# Patient Record
Sex: FEMALE | Race: WHITE | NOT HISPANIC OR LATINO | URBAN - METROPOLITAN AREA
[De-identification: names, ages, dates, MRNs, and addresses within clinical notes are randomized per-mention and may not be internally consistent; named-entity substitution may affect disease eponyms.]

---

## 2017-01-12 ENCOUNTER — EMERGENCY (EMERGENCY)
Facility: HOSPITAL | Age: 62
LOS: 1 days | Discharge: HOME | End: 2017-01-12
Attending: EMERGENCY MEDICINE | Admitting: EMERGENCY MEDICINE
Payer: COMMERCIAL

## 2017-01-12 VITALS
TEMPERATURE: 98 F | DIASTOLIC BLOOD PRESSURE: 80 MMHG | HEIGHT: 63 IN | HEART RATE: 82 BPM | WEIGHT: 160.06 LBS | SYSTOLIC BLOOD PRESSURE: 122 MMHG | OXYGEN SATURATION: 98 % | RESPIRATION RATE: 16 BRPM

## 2017-01-12 DIAGNOSIS — R07.89 OTHER CHEST PAIN: ICD-10-CM

## 2017-01-12 LAB
ALBUMIN SERPL ELPH-MCNC: 4.9 G/DL — SIGNIFICANT CHANGE UP (ref 3.3–5)
ALP SERPL-CCNC: 63 U/L — SIGNIFICANT CHANGE UP (ref 40–120)
ALT FLD-CCNC: 18 U/L RC — SIGNIFICANT CHANGE UP (ref 10–45)
ANION GAP SERPL CALC-SCNC: 14 MMOL/L — SIGNIFICANT CHANGE UP (ref 5–17)
APTT BLD: 30.4 SEC — SIGNIFICANT CHANGE UP (ref 27.5–37.4)
AST SERPL-CCNC: 21 U/L — SIGNIFICANT CHANGE UP (ref 10–40)
BASOPHILS # BLD AUTO: 0.1 K/UL — SIGNIFICANT CHANGE UP (ref 0–0.2)
BASOPHILS NFR BLD AUTO: 0.6 % — SIGNIFICANT CHANGE UP (ref 0–2)
BILIRUB SERPL-MCNC: 0.2 MG/DL — SIGNIFICANT CHANGE UP (ref 0.2–1.2)
BUN SERPL-MCNC: 18 MG/DL — SIGNIFICANT CHANGE UP (ref 7–23)
CALCIUM SERPL-MCNC: 9.7 MG/DL — SIGNIFICANT CHANGE UP (ref 8.4–10.5)
CHLORIDE SERPL-SCNC: 102 MMOL/L — SIGNIFICANT CHANGE UP (ref 96–108)
CO2 SERPL-SCNC: 26 MMOL/L — SIGNIFICANT CHANGE UP (ref 22–31)
CREAT SERPL-MCNC: 1.01 MG/DL — SIGNIFICANT CHANGE UP (ref 0.5–1.3)
D DIMER BLD IA.RAPID-MCNC: <150 NG/ML DDU — SIGNIFICANT CHANGE UP
EOSINOPHIL # BLD AUTO: 0.2 K/UL — SIGNIFICANT CHANGE UP (ref 0–0.5)
EOSINOPHIL NFR BLD AUTO: 1.6 % — SIGNIFICANT CHANGE UP (ref 0–6)
GLUCOSE SERPL-MCNC: 101 MG/DL — HIGH (ref 70–99)
HCT VFR BLD CALC: 38.4 % — SIGNIFICANT CHANGE UP (ref 34.5–45)
HGB BLD-MCNC: 13.3 G/DL — SIGNIFICANT CHANGE UP (ref 11.5–15.5)
INR BLD: 0.97 RATIO — SIGNIFICANT CHANGE UP (ref 0.88–1.16)
LYMPHOCYTES # BLD AUTO: 3.5 K/UL — HIGH (ref 1–3.3)
LYMPHOCYTES # BLD AUTO: 32.5 % — SIGNIFICANT CHANGE UP (ref 13–44)
MCHC RBC-ENTMCNC: 31.4 PG — SIGNIFICANT CHANGE UP (ref 27–34)
MCHC RBC-ENTMCNC: 34.6 GM/DL — SIGNIFICANT CHANGE UP (ref 32–36)
MCV RBC AUTO: 91 FL — SIGNIFICANT CHANGE UP (ref 80–100)
MONOCYTES # BLD AUTO: 0.8 K/UL — SIGNIFICANT CHANGE UP (ref 0–0.9)
MONOCYTES NFR BLD AUTO: 7.2 % — SIGNIFICANT CHANGE UP (ref 2–14)
NEUTROPHILS # BLD AUTO: 6.3 K/UL — SIGNIFICANT CHANGE UP (ref 1.8–7.4)
NEUTROPHILS NFR BLD AUTO: 58.2 % — SIGNIFICANT CHANGE UP (ref 43–77)
PLATELET # BLD AUTO: 225 K/UL — SIGNIFICANT CHANGE UP (ref 150–400)
POTASSIUM SERPL-MCNC: 3.8 MMOL/L — SIGNIFICANT CHANGE UP (ref 3.5–5.3)
POTASSIUM SERPL-SCNC: 3.8 MMOL/L — SIGNIFICANT CHANGE UP (ref 3.5–5.3)
PROT SERPL-MCNC: 7.7 G/DL — SIGNIFICANT CHANGE UP (ref 6–8.3)
PROTHROM AB SERPL-ACNC: 10.6 SEC — SIGNIFICANT CHANGE UP (ref 10–13.1)
RBC # BLD: 4.23 M/UL — SIGNIFICANT CHANGE UP (ref 3.8–5.2)
RBC # FLD: 12.1 % — SIGNIFICANT CHANGE UP (ref 10.3–14.5)
SODIUM SERPL-SCNC: 142 MMOL/L — SIGNIFICANT CHANGE UP (ref 135–145)
TROPONIN T SERPL-MCNC: <0.01 NG/ML — SIGNIFICANT CHANGE UP (ref 0–0.06)
WBC # BLD: 10.8 K/UL — HIGH (ref 3.8–10.5)
WBC # FLD AUTO: 10.8 K/UL — HIGH (ref 3.8–10.5)

## 2017-01-12 PROCEDURE — 99285 EMERGENCY DEPT VISIT HI MDM: CPT | Mod: 25

## 2017-01-12 PROCEDURE — 71010: CPT | Mod: 26

## 2017-01-12 PROCEDURE — 93010 ELECTROCARDIOGRAM REPORT: CPT | Mod: NC

## 2017-01-12 RX ORDER — LIDOCAINE 4 G/100G
10 CREAM TOPICAL ONCE
Qty: 0 | Refills: 0 | Status: COMPLETED | OUTPATIENT
Start: 2017-01-12 | End: 2017-01-12

## 2017-01-12 RX ADMIN — LIDOCAINE 10 MILLILITER(S): 4 CREAM TOPICAL at 21:46

## 2017-01-12 RX ADMIN — Medication 30 MILLILITER(S): at 21:46

## 2017-01-12 NOTE — ED PROVIDER NOTE - NS ED MD SCRIBE ATTENDING SCRIBE SECTIONS
HIV/VITAL SIGNS( Pullset)/PHYSICAL EXAM/REVIEW OF SYSTEMS/HISTORY OF PRESENT ILLNESS/PAST MEDICAL/SURGICAL/SOCIAL HISTORY/DISPOSITION

## 2017-01-12 NOTE — ED PROVIDER NOTE - MEDICAL DECISION MAKING DETAILS
61 year old female presents with chest pain x2 weeks. HEART score of 1 -- considered low risk. Patient already has outpatient stress test scheduled. Plan: serial Trop, if within normal limits patient to  follow up as outpatient.

## 2017-01-12 NOTE — ED PROVIDER NOTE - OBJECTIVE STATEMENT
61 year old female with past medical history of GERD presents to the Emergency Department complaining of left sided chest pain radiating to her back x2 weeks. Pain worsens with lying down. Today, patient developed hiccups. Patient has taken ASA today prior to arrival. Stress test many years ago; next stress test is scheduled later next week. No family history of Coronary Artery Disease less than 55 years old.   Denies fever, chills, shortness of breath, nausea, vomiting, diarrhea, abdominal pain, or any other complaints.

## 2017-01-12 NOTE — ED ADULT NURSE NOTE - OBJECTIVE STATEMENT
61 year old female presented to the ED complaining of mid sternal chest pain radiating to back that as per patient began almost 2 weeks at. As per patient she is scheduled for a stress test next week through her cardiologist. Pts father passed away from a cardiac arrest at 80 years old. Pt has a history of a cholecystectomy, appendectomy, and tonsillectomy in the past. Pt is A&O x 4, VSS, afebrile, NS on cardiac monitor. Pt states that she's had periods of nausea over the last two weeks ago and has woken up at night with her left hand feeling numb. Pt denies SOB or diaphoresis. Pt denies recent infection, fever, chills, VD. patient on cardiac monitor,  at bedside.

## 2017-01-12 NOTE — ED PROVIDER NOTE - PSH
Cataract  left  GERD (Gastroesophageal Reflux Disease)    S/P Appendectomy    S/P Cholecystectomy    S/P Tonsillectomy

## 2017-01-12 NOTE — ED PROVIDER NOTE - INTERPRETATION
normal sinus rhythm, Normal axis, Normal UT interval and QRS complex. There are no acute ischemic ST or T-wave changes.

## 2017-01-13 VITALS
HEART RATE: 67 BPM | SYSTOLIC BLOOD PRESSURE: 120 MMHG | OXYGEN SATURATION: 98 % | TEMPERATURE: 98 F | RESPIRATION RATE: 16 BRPM | DIASTOLIC BLOOD PRESSURE: 80 MMHG

## 2017-01-13 LAB — TROPONIN T SERPL-MCNC: <0.01 NG/ML — SIGNIFICANT CHANGE UP (ref 0–0.06)

## 2017-01-13 PROCEDURE — 85730 THROMBOPLASTIN TIME PARTIAL: CPT

## 2017-01-13 PROCEDURE — 80053 COMPREHEN METABOLIC PANEL: CPT

## 2017-01-13 PROCEDURE — 84484 ASSAY OF TROPONIN QUANT: CPT

## 2017-01-13 PROCEDURE — 93005 ELECTROCARDIOGRAM TRACING: CPT

## 2017-01-13 PROCEDURE — 71045 X-RAY EXAM CHEST 1 VIEW: CPT

## 2017-01-13 PROCEDURE — 99283 EMERGENCY DEPT VISIT LOW MDM: CPT | Mod: 25

## 2017-01-13 PROCEDURE — 85379 FIBRIN DEGRADATION QUANT: CPT

## 2017-01-13 PROCEDURE — 85027 COMPLETE CBC AUTOMATED: CPT

## 2017-01-13 PROCEDURE — 85610 PROTHROMBIN TIME: CPT

## 2017-04-10 ENCOUNTER — EMERGENCY (EMERGENCY)
Facility: HOSPITAL | Age: 62
LOS: 1 days | Discharge: HOME | End: 2017-04-10
Attending: EMERGENCY MEDICINE | Admitting: EMERGENCY MEDICINE
Payer: COMMERCIAL

## 2017-04-10 VITALS
TEMPERATURE: 97 F | SYSTOLIC BLOOD PRESSURE: 128 MMHG | WEIGHT: 149.91 LBS | RESPIRATION RATE: 18 BRPM | HEART RATE: 83 BPM | HEIGHT: 64 IN | OXYGEN SATURATION: 100 % | DIASTOLIC BLOOD PRESSURE: 77 MMHG

## 2017-04-10 VITALS
HEART RATE: 78 BPM | OXYGEN SATURATION: 98 % | RESPIRATION RATE: 16 BRPM | SYSTOLIC BLOOD PRESSURE: 112 MMHG | DIASTOLIC BLOOD PRESSURE: 72 MMHG

## 2017-04-10 PROBLEM — K21.9 GASTRO-ESOPHAGEAL REFLUX DISEASE WITHOUT ESOPHAGITIS: Chronic | Status: ACTIVE | Noted: 2017-01-13

## 2017-04-10 LAB
ALBUMIN SERPL ELPH-MCNC: 4.4 G/DL — SIGNIFICANT CHANGE UP (ref 3.3–5)
ALP SERPL-CCNC: 57 U/L — SIGNIFICANT CHANGE UP (ref 40–120)
ALT FLD-CCNC: 16 U/L RC — SIGNIFICANT CHANGE UP (ref 10–45)
ANION GAP SERPL CALC-SCNC: 15 MMOL/L — SIGNIFICANT CHANGE UP (ref 5–17)
APPEARANCE UR: CLEAR — SIGNIFICANT CHANGE UP
AST SERPL-CCNC: 20 U/L — SIGNIFICANT CHANGE UP (ref 10–40)
BASOPHILS # BLD AUTO: 0 K/UL — SIGNIFICANT CHANGE UP (ref 0–0.2)
BASOPHILS NFR BLD AUTO: 0.8 % — SIGNIFICANT CHANGE UP (ref 0–2)
BILIRUB SERPL-MCNC: 0.3 MG/DL — SIGNIFICANT CHANGE UP (ref 0.2–1.2)
BILIRUB UR-MCNC: NEGATIVE — SIGNIFICANT CHANGE UP
BUN SERPL-MCNC: 18 MG/DL — SIGNIFICANT CHANGE UP (ref 7–23)
CALCIUM SERPL-MCNC: 9.6 MG/DL — SIGNIFICANT CHANGE UP (ref 8.4–10.5)
CHLORIDE SERPL-SCNC: 104 MMOL/L — SIGNIFICANT CHANGE UP (ref 96–108)
CO2 SERPL-SCNC: 25 MMOL/L — SIGNIFICANT CHANGE UP (ref 22–31)
COLOR SPEC: SIGNIFICANT CHANGE UP
CREAT SERPL-MCNC: 0.7 MG/DL — SIGNIFICANT CHANGE UP (ref 0.5–1.3)
DIFF PNL FLD: ABNORMAL
EOSINOPHIL # BLD AUTO: 0.1 K/UL — SIGNIFICANT CHANGE UP (ref 0–0.5)
EOSINOPHIL NFR BLD AUTO: 1.3 % — SIGNIFICANT CHANGE UP (ref 0–6)
EPI CELLS # UR: SIGNIFICANT CHANGE UP /HPF
GAS PNL BLDV: SIGNIFICANT CHANGE UP
GLUCOSE SERPL-MCNC: 112 MG/DL — HIGH (ref 70–99)
GLUCOSE UR QL: NEGATIVE — SIGNIFICANT CHANGE UP
HCT VFR BLD CALC: 40.6 % — SIGNIFICANT CHANGE UP (ref 34.5–45)
HGB BLD-MCNC: 13.4 G/DL — SIGNIFICANT CHANGE UP (ref 11.5–15.5)
KETONES UR-MCNC: NEGATIVE — SIGNIFICANT CHANGE UP
LEUKOCYTE ESTERASE UR-ACNC: NEGATIVE — SIGNIFICANT CHANGE UP
LYMPHOCYTES # BLD AUTO: 1.8 K/UL — SIGNIFICANT CHANGE UP (ref 1–3.3)
LYMPHOCYTES # BLD AUTO: 31.4 % — SIGNIFICANT CHANGE UP (ref 13–44)
MCHC RBC-ENTMCNC: 30 PG — SIGNIFICANT CHANGE UP (ref 27–34)
MCHC RBC-ENTMCNC: 33 GM/DL — SIGNIFICANT CHANGE UP (ref 32–36)
MCV RBC AUTO: 90.9 FL — SIGNIFICANT CHANGE UP (ref 80–100)
MONOCYTES # BLD AUTO: 0.4 K/UL — SIGNIFICANT CHANGE UP (ref 0–0.9)
MONOCYTES NFR BLD AUTO: 7.2 % — SIGNIFICANT CHANGE UP (ref 2–14)
NEUTROPHILS # BLD AUTO: 3.4 K/UL — SIGNIFICANT CHANGE UP (ref 1.8–7.4)
NEUTROPHILS NFR BLD AUTO: 59.3 % — SIGNIFICANT CHANGE UP (ref 43–77)
NITRITE UR-MCNC: NEGATIVE — SIGNIFICANT CHANGE UP
PH UR: 6.5 — SIGNIFICANT CHANGE UP (ref 4.8–8)
PLATELET # BLD AUTO: 216 K/UL — SIGNIFICANT CHANGE UP (ref 150–400)
POTASSIUM SERPL-MCNC: 3.8 MMOL/L — SIGNIFICANT CHANGE UP (ref 3.5–5.3)
POTASSIUM SERPL-SCNC: 3.8 MMOL/L — SIGNIFICANT CHANGE UP (ref 3.5–5.3)
PROT SERPL-MCNC: 7 G/DL — SIGNIFICANT CHANGE UP (ref 6–8.3)
PROT UR-MCNC: NEGATIVE — SIGNIFICANT CHANGE UP
RBC # BLD: 4.47 M/UL — SIGNIFICANT CHANGE UP (ref 3.8–5.2)
RBC # FLD: 11.3 % — SIGNIFICANT CHANGE UP (ref 10.3–14.5)
RBC CASTS # UR COMP ASSIST: SIGNIFICANT CHANGE UP /HPF (ref 0–2)
SODIUM SERPL-SCNC: 144 MMOL/L — SIGNIFICANT CHANGE UP (ref 135–145)
SP GR SPEC: 1.01 — LOW (ref 1.01–1.02)
UROBILINOGEN FLD QL: NEGATIVE — SIGNIFICANT CHANGE UP
WBC # BLD: 5.8 K/UL — SIGNIFICANT CHANGE UP (ref 3.8–10.5)
WBC # FLD AUTO: 5.8 K/UL — SIGNIFICANT CHANGE UP (ref 3.8–10.5)
WBC UR QL: SIGNIFICANT CHANGE UP /HPF (ref 0–5)

## 2017-04-10 PROCEDURE — 80053 COMPREHEN METABOLIC PANEL: CPT

## 2017-04-10 PROCEDURE — 84132 ASSAY OF SERUM POTASSIUM: CPT

## 2017-04-10 PROCEDURE — 74176 CT ABD & PELVIS W/O CONTRAST: CPT

## 2017-04-10 PROCEDURE — 82435 ASSAY OF BLOOD CHLORIDE: CPT

## 2017-04-10 PROCEDURE — 83605 ASSAY OF LACTIC ACID: CPT

## 2017-04-10 PROCEDURE — 82803 BLOOD GASES ANY COMBINATION: CPT

## 2017-04-10 PROCEDURE — 82565 ASSAY OF CREATININE: CPT

## 2017-04-10 PROCEDURE — 85014 HEMATOCRIT: CPT

## 2017-04-10 PROCEDURE — 87086 URINE CULTURE/COLONY COUNT: CPT

## 2017-04-10 PROCEDURE — 82330 ASSAY OF CALCIUM: CPT

## 2017-04-10 PROCEDURE — 99284 EMERGENCY DEPT VISIT MOD MDM: CPT | Mod: 25

## 2017-04-10 PROCEDURE — 81001 URINALYSIS AUTO W/SCOPE: CPT

## 2017-04-10 PROCEDURE — 99285 EMERGENCY DEPT VISIT HI MDM: CPT

## 2017-04-10 PROCEDURE — 74176 CT ABD & PELVIS W/O CONTRAST: CPT | Mod: 26

## 2017-04-10 PROCEDURE — 84295 ASSAY OF SERUM SODIUM: CPT

## 2017-04-10 PROCEDURE — 85027 COMPLETE CBC AUTOMATED: CPT

## 2017-04-10 PROCEDURE — 82947 ASSAY GLUCOSE BLOOD QUANT: CPT

## 2017-04-10 RX ORDER — ESOMEPRAZOLE MAGNESIUM 40 MG/1
1 CAPSULE, DELAYED RELEASE ORAL
Qty: 0 | Refills: 0 | COMMUNITY

## 2017-04-10 RX ORDER — MOXIFLOXACIN HYDROCHLORIDE TABLETS, 400 MG 400 MG/1
1 TABLET, FILM COATED ORAL
Qty: 0 | Refills: 0 | COMMUNITY

## 2017-04-10 RX ORDER — SODIUM CHLORIDE 9 MG/ML
1000 INJECTION INTRAMUSCULAR; INTRAVENOUS; SUBCUTANEOUS ONCE
Qty: 0 | Refills: 0 | Status: COMPLETED | OUTPATIENT
Start: 2017-04-10 | End: 2017-04-10

## 2017-04-10 RX ADMIN — SODIUM CHLORIDE 1000 MILLILITER(S): 9 INJECTION INTRAMUSCULAR; INTRAVENOUS; SUBCUTANEOUS at 09:36

## 2017-04-10 NOTE — ED PROVIDER NOTE - ATTENDING CONTRIBUTION TO CARE
Patient presenting c/o lower back pain x 2 weeks with dysuria.  Seen at , started on cipro without relief.  Contacted  this AM and told culture negative.  History of chronic urinary issues/recurrent UTI.  No fevers or chills, no nausea or vomiting.    On exam patient well appearing, VS WNL, RRR, CTABL, abdomen soft, diffuse mild lumbar TTP.  Normal strength/sensation.    UA obtained demonstrating small blood, in setting of negative culture concerned for possible stone - labs, CT A/P, likely dc with urology follow up.

## 2017-04-10 NOTE — ED PROVIDER NOTE - PHYSICAL EXAMINATION
Gen: Well appearing, NAD, AOx3  Head: NCAT  HEENT: MMM, normal conjunctiva  Lung: CTAB, no rales, rhonchi or wheezing  CV: S1/S2, no murmurs, rubs or gallops  Abd: soft, NTND, no rebound or guarding  MSK: No CVA tenderness. No edema, no visible deformities  Neuro: No focal neurologic deficits. CN II-XII intact. Normal strength and sensation x 4  Skin: Warm and dry, no evidence of rash  Psych: normal mood and affect

## 2017-04-10 NOTE — ED ADULT NURSE NOTE - OBJECTIVE STATEMENT
60 y/o F ambulatory to ED with steady gait c/o polyuria, vaginal pressure prior to urination and bilat lower back pain x3 weeks.  A&Ox4.  No dizziness.  No SOB.  Lungs clear and equal to auscultation.  Easy work of breathing.  No chest pain.  No numbness or tingling.  No edema.  Abd soft round nontender.  No n/v/d.  Pt reports intermittent decreased appetite, tolerating PO without difficulty.  No CVA tenderness.  Pt denies kallie blood.  Will continue to monitor.  Safety maintained.  NAD. VSS.

## 2017-04-10 NOTE — ED PROVIDER NOTE - MEDICAL DECISION MAKING DETAILS
61 y.o. female pw dysuria and lower back pain. Likely pyelo. kidney stone unlikely given hx and physical exam. Well appearing, vss. Will check labs, ua/culture, reevaluate.

## 2017-04-10 NOTE — ED PROVIDER NOTE - OBJECTIVE STATEMENT
61 y.o. female hx of recurrent UTI over past 4 months pw dysuria and lower back pain for past 3 days, went to urgent care, had +ua, started on cipro, not improving. Denies fever, nausea or vomiting. No ab pain or hematuria. No vaginal bleeding.

## 2017-04-10 NOTE — ED PROVIDER NOTE - NS ED ROS FT
ROS: denies HA, weakness, dizziness, fevers/chills, nausea/vomiting, chest pain, SOB, diaphoresis, abdominal pain, neck pain, hematuria, or rash

## 2017-04-11 LAB
CULTURE RESULTS: SIGNIFICANT CHANGE UP
SPECIMEN SOURCE: SIGNIFICANT CHANGE UP

## 2017-04-13 DIAGNOSIS — Z90.89 ACQUIRED ABSENCE OF OTHER ORGANS: ICD-10-CM

## 2017-04-13 DIAGNOSIS — Z90.49 ACQUIRED ABSENCE OF OTHER SPECIFIED PARTS OF DIGESTIVE TRACT: ICD-10-CM

## 2017-04-13 DIAGNOSIS — K21.9 GASTRO-ESOPHAGEAL REFLUX DISEASE WITHOUT ESOPHAGITIS: ICD-10-CM

## 2017-04-13 DIAGNOSIS — M54.5 LOW BACK PAIN: ICD-10-CM

## 2017-04-13 DIAGNOSIS — R30.0 DYSURIA: ICD-10-CM

## 2018-07-11 PROBLEM — Z00.00 ENCOUNTER FOR PREVENTIVE HEALTH EXAMINATION: Status: ACTIVE | Noted: 2018-07-11

## 2018-07-16 ENCOUNTER — APPOINTMENT (OUTPATIENT)
Dept: OBGYN | Facility: CLINIC | Age: 63
End: 2018-07-16

## 2019-02-11 ENCOUNTER — EMERGENCY (EMERGENCY)
Facility: HOSPITAL | Age: 64
LOS: 1 days | Discharge: HOME | End: 2019-02-11
Attending: EMERGENCY MEDICINE
Payer: COMMERCIAL

## 2019-02-11 VITALS
HEART RATE: 68 BPM | RESPIRATION RATE: 18 BRPM | SYSTOLIC BLOOD PRESSURE: 108 MMHG | DIASTOLIC BLOOD PRESSURE: 72 MMHG | OXYGEN SATURATION: 100 %

## 2019-02-11 VITALS
RESPIRATION RATE: 17 BRPM | HEART RATE: 87 BPM | HEIGHT: 65 IN | DIASTOLIC BLOOD PRESSURE: 82 MMHG | TEMPERATURE: 98 F | WEIGHT: 160.94 LBS | SYSTOLIC BLOOD PRESSURE: 147 MMHG | OXYGEN SATURATION: 100 %

## 2019-02-11 LAB
APPEARANCE UR: CLEAR — SIGNIFICANT CHANGE UP
BACTERIA # UR AUTO: NEGATIVE — SIGNIFICANT CHANGE UP
BILIRUB UR-MCNC: NEGATIVE — SIGNIFICANT CHANGE UP
COLOR SPEC: YELLOW — SIGNIFICANT CHANGE UP
DIFF PNL FLD: ABNORMAL
EPI CELLS # UR: 7 /HPF — HIGH
GLUCOSE UR QL: NEGATIVE — SIGNIFICANT CHANGE UP
HYALINE CASTS # UR AUTO: 4 /LPF — HIGH (ref 0–2)
KETONES UR-MCNC: NEGATIVE — SIGNIFICANT CHANGE UP
LEUKOCYTE ESTERASE UR-ACNC: ABNORMAL
NITRITE UR-MCNC: NEGATIVE — SIGNIFICANT CHANGE UP
PH UR: 6 — SIGNIFICANT CHANGE UP (ref 5–8)
PROT UR-MCNC: ABNORMAL
RBC CASTS # UR COMP ASSIST: 3 /HPF — SIGNIFICANT CHANGE UP (ref 0–4)
SP GR SPEC: 1.03 — HIGH (ref 1.01–1.02)
UROBILINOGEN FLD QL: NEGATIVE — SIGNIFICANT CHANGE UP
WBC UR QL: 21 /HPF — HIGH (ref 0–5)

## 2019-02-11 PROCEDURE — 99283 EMERGENCY DEPT VISIT LOW MDM: CPT

## 2019-02-11 PROCEDURE — 81001 URINALYSIS AUTO W/SCOPE: CPT

## 2019-02-11 PROCEDURE — 87086 URINE CULTURE/COLONY COUNT: CPT

## 2019-02-11 RX ORDER — CEPHALEXIN 500 MG
500 CAPSULE ORAL ONCE
Qty: 0 | Refills: 0 | Status: COMPLETED | OUTPATIENT
Start: 2019-02-11 | End: 2019-02-11

## 2019-02-11 RX ORDER — CEPHALEXIN 500 MG
1 CAPSULE ORAL
Qty: 13 | Refills: 0 | OUTPATIENT
Start: 2019-02-11 | End: 2019-02-17

## 2019-02-11 RX ADMIN — Medication 500 MILLIGRAM(S): at 12:12

## 2019-02-11 NOTE — ED ADULT TRIAGE NOTE - CHIEF COMPLAINT QUOTE
patient c/o bilateral back pain with urinary frequency, urgency x2 weeks. patient diagnosed with UTI, placed on Cipro and completed course.

## 2019-02-11 NOTE — ED PROVIDER NOTE - MEDICAL DECISION MAKING DETAILS
Pt with recurrent urine infections has urine frequency and back pains no fever no n/v no blood in urine No rash Family h/o MS Pt also gives h/o tingling of finger tips and at time difficulty in urination Abdomen soft suprapubic discomfort No CVA tenderness Non focal neuro exam labs re eval concern for possible MS --Nation

## 2019-02-11 NOTE — ED ADULT NURSE REASSESSMENT NOTE - NS ED NURSE REASSESS COMMENT FT1
Bladder scan performed. Patient has approximately 200ml of urine post void. MD Nation made aware. Will continue to monitor.

## 2019-02-11 NOTE — ED PROVIDER NOTE - NSFOLLOWUPINSTRUCTIONS_ED_ALL_ED_FT
Follow-up with your PCP in 2-3 days. Additionally Follow-up with your urologist, Dr. Katz within 1 week, and in the neurology clinic within 1 week.    Read all discharge instructions    Take Keflex (cephalexin) as directed.    Continue to take all other medications as directed.    Return to the emergency room immediately if your symptoms worsen or persist, or if you have fever (100.4'F), shaking chills, headache, back/side pain, difficulty urinating, vomiting and unable to keep food or water down, or if any other concerning or questionable symptoms.

## 2019-02-11 NOTE — ED PROVIDER NOTE - CARE PLAN
Assessment and plan of treatment:	1) Follow-up with your PCP in 2-3 days. Additionally Follow-up with your urologist, Dr. Katz within 1 week, and in the neurology clinic within 1 week. Principal Discharge DX:	Acute cystitis without hematuria  Assessment and plan of treatment:	Follow-up with your PCP in 2-3 days. Additionally Follow-up with your urologist, Dr. Katz within 1 week, and in the neurology clinic within 1 week.  Read all discharge instructions    Take Keflex (cephalexin) as directed.    Continue to take all other medications as directed.    Return to the emergency room immediately if your symptoms worsen or persist, or if you have fever (100.4'F), shaking chills, headache, back/side pain, difficulty urinating, vomiting and unable to keep food or water down, or if any other concerning or questionable symptoms.

## 2019-02-11 NOTE — ED PROVIDER NOTE - ATTENDING CONTRIBUTION TO CARE
I have seen and evaluated this patient with the [facility] practice clinician.   I agree with the findings  unless other wise stated. I have amended notes where needed.  After my face to face bedside evaluation, I am noting: Pt with recurrent urine infections has urine frequency and back pains no fever no n/v no blood in urine No rash Family h/o MS Pt also gives h/o tingling of finger tips and at time difficulty in urination Abdomen soft suprapubic discomfort No CVA tenderness Non focal neuro exam labs re eval concern for possible MS --Nation

## 2019-02-11 NOTE — ED ADULT NURSE NOTE - OBJECTIVE STATEMENT
63 year old female comes to the ED c/o urinary frequency and flank pain for the past 2 weeks. Patient was recently diagnosed with a UTI and was placed on Cipro. Patient states finishing the course of the medications. Patient describes the pain as a sharp pain to her right and left flank area; no CVA tenderness is noted. Patient states the pain is a 7/10 on the pain scale. Patient has a PMH of chronic UTIs and GERD. Upon assessment, patient is a/ox3, VSS and in NAD. Patient's abdomen is soft, nontender and nondistended. Patient's bladder is distended; patient states she is unable to fully void and has trouble beginning urination. Patient's lung sounds are clear and equal with no labored breathing noted. Patient's skin is warm, dry intact and normal for race. Patient's peripheral pulses are strong and equal to all extremities with no edema present. Patient denies chest pain/SOB, fever/chills, n/v/d, hematuria, abdominal pain, numbness/tingling/weakness. Patient is resting comfortably in bed awaiting for MD to assess and discuss plan of care. Comfort and safety measures have been initiated.

## 2019-02-11 NOTE — ED PROVIDER NOTE - CLINICAL SUMMARY MEDICAL DECISION MAKING FREE TEXT BOX
Pt Pt with recurrent urine infections has urine frequency and back pains no fever no n/v no blood in urine No rash Family h/o MS Pt also gives h/o tingling of finger tips and at time difficulty in urination Abdomen soft suprapubic discomfort No CVA tenderness Non focal neuro exam labs re eval concern for possible MS --[place]ation

## 2019-02-11 NOTE — ED PROVIDER NOTE - PHYSICAL EXAMINATION
GEN: Pt in NAD, A&O x3.  PSYCH: Affect appropriate.  EYES: Sclera white w/o injection, PERRLA, EOMI   ENT: Head NCAT. Nose w/o deformity. No auricular TTP. MMM. Neck supple FROM.   RESP: No chest wall tenderness, CTA b/l, no wheezes, rales, or rhonchi.   CARDIAC: RRR, clear distinct S1, S2, no appreciable murmurs.  ABD: Abdomen soft, non-tender. No CVAT b/l.  VASC: 2+ radial and dorsalis pedis pulses b/l. No edema or calf tenderness.  NEURO: CN2-12 grossly intact. Normal and equal sensation and 5/5 strength UE and LE b/l. Pronator drift negative. Steady gait and normal gross cerebellar function.   MSK: No obvious spinal deformity. No midline or paraspinal TTP. FROM trunk w/o pain. No pain with flexion of the neck.  SKIN: No rashes on the trunk.

## 2019-02-11 NOTE — ED PROVIDER NOTE - PLAN OF CARE
1) Follow-up with your PCP in 2-3 days. Additionally Follow-up with your urologist, Dr. Katz within 1 week, and in the neurology clinic within 1 week. Follow-up with your PCP in 2-3 days. Additionally Follow-up with your urologist, Dr. Katz within 1 week, and in the neurology clinic within 1 week.  Read all discharge instructions    Take Keflex (cephalexin) as directed.    Continue to take all other medications as directed.    Return to the emergency room immediately if your symptoms worsen or persist, or if you have fever (100.4'F), shaking chills, headache, back/side pain, difficulty urinating, vomiting and unable to keep food or water down, or if any other concerning or questionable symptoms.

## 2019-02-11 NOTE — ED PROVIDER NOTE - OBJECTIVE STATEMENT
64 y/o F with PMHx of GERD and frequent UTIs presents c/o urinary sxs x 2 weeks. Pt state she has had frequency and urgency x 2weeks, occasdionally feels that she has to go but cannot get herself to go. Pt went to UC when sxs started and was prescribed Cipro which she took with no change in sxs. Pt notes 6/10 b/l lowe back pain, pulling, no radiation, worse with twisting, improved with Tylenol and motrin last dose yesterday, for the past several days. Pt has had similar sxs in the past and was admitted once prior for UTI in the distant past, no h/o kidney stones. Pt has seen urology once in the past several years ago (Dr. Katz). Of note pt state she has occasional intermittent episodes of paresthesias in the hands and has +FHx of MS. Pt denies f/c, IVDA, falls/trauma, current numbness, paresthesias, weakness, dizziness, altered gait, HA, change in vision, CP, SOB, cough, abd pain, n/v/d, dysuria, hematuria, change in stools, urinary or fecal incontinence, saddle anesthesia, heavy lifting/strenuous activity, h/o spinal pathology.

## 2019-02-12 LAB
CULTURE RESULTS: SIGNIFICANT CHANGE UP
SPECIMEN SOURCE: SIGNIFICANT CHANGE UP

## 2019-02-13 NOTE — ED POST DISCHARGE NOTE - DETAILS
2/13/19: Attempted to contact pt regarding ucx contamination and advise repeat testing. No answer, left voice message with callback number. - Jeremiah Blair PA-C

## 2019-02-13 NOTE — ED POST DISCHARGE NOTE - OTHER COMMUNICATION
2/13/19: Patient contacted, informed of ucx results. Since she was symptomatic advised to continue abx as prescribed and have testing repeated with PMD/urologist this week. Pt appreciative of call and acknowledges understanding. - Jeremiah Blair PA-C 2/13/19: Patient returned call, informed of ucx results. Since she was symptomatic advised to continue abx as prescribed and have testing repeated with PMD/urologist this week. Pt appreciative of call and acknowledges understanding. - Jeremiah Blair PA-C

## 2022-06-21 ENCOUNTER — EMERGENCY (EMERGENCY)
Facility: HOSPITAL | Age: 67
LOS: 1 days | Discharge: HOME | End: 2022-06-21
Attending: EMERGENCY MEDICINE
Payer: MEDICARE

## 2022-06-21 VITALS
OXYGEN SATURATION: 96 % | SYSTOLIC BLOOD PRESSURE: 120 MMHG | DIASTOLIC BLOOD PRESSURE: 78 MMHG | TEMPERATURE: 98 F | HEART RATE: 80 BPM | HEIGHT: 65 IN | WEIGHT: 164.02 LBS | RESPIRATION RATE: 18 BRPM

## 2022-06-21 VITALS
HEART RATE: 55 BPM | SYSTOLIC BLOOD PRESSURE: 115 MMHG | TEMPERATURE: 98 F | RESPIRATION RATE: 17 BRPM | DIASTOLIC BLOOD PRESSURE: 73 MMHG | OXYGEN SATURATION: 99 %

## 2022-06-21 PROCEDURE — 73502 X-RAY EXAM HIP UNI 2-3 VIEWS: CPT

## 2022-06-21 PROCEDURE — 73502 X-RAY EXAM HIP UNI 2-3 VIEWS: CPT | Mod: 26,LT

## 2022-06-21 PROCEDURE — 99283 EMERGENCY DEPT VISIT LOW MDM: CPT | Mod: 25

## 2022-06-21 PROCEDURE — 99283 EMERGENCY DEPT VISIT LOW MDM: CPT | Mod: FS

## 2022-06-21 RX ADMIN — Medication 24 MILLIGRAM(S): at 13:27

## 2022-06-21 NOTE — ED PROVIDER NOTE - CLINICAL SUMMARY MEDICAL DECISION MAKING FREE TEXT BOX
adolfo - 66 f wioth persistant low back pain on left down lateral and post thigh worse w movt - neg duplex and mri with multiple levels  discs-- sensation intact on exam good distal pulses from no bony ttp- no pain w log roll- ambulatory in ed - pain appears to be radicular -- pt reports mod releief w nsaids - consider short course of steriods and outpt fu

## 2022-06-21 NOTE — ED ADULT NURSE NOTE - NSICDXPASTSURGICALHX_GEN_ALL_CORE_FT
PAST SURGICAL HISTORY:  Cataract left    GERD (Gastroesophageal Reflux Disease)     S/P Appendectomy     S/P Cholecystectomy     S/P Tonsillectomy

## 2022-06-21 NOTE — ED PROVIDER NOTE - PATIENT PORTAL LINK FT
You can access the FollowMyHealth Patient Portal offered by [facility] by registering at the following website: [email or web address]. By joining [employer]’s FollowMyHealth portal, you will also be able to view your health information using other applications (apps) compatible with our system.

## 2022-06-21 NOTE — ED PROVIDER NOTE - PHYSICAL EXAMINATION
A&Ox3, NAD, well appearing  Neck supple, no LAD.   Lungs CTAB. No w/r/r  Cardiac +S1S2, RRR, No m/r/g.   Abd soft, NT/ND, +BS, no rebound or guarding.   Extremities: cap refill <2, pulses in distal extremities 4+, no edema.   Skin without rash.   No focal Defecits, Strength 5/5 UE/LE. Gait steady. strength 5/5 L. hip/knee/plantar flexion/extensions without temderness. No vertebral ttp of c/t/l spine.

## 2022-06-21 NOTE — ED PROVIDER NOTE - NS ED ROS FT
Constitutional: No fever or chills  CV: No chest pain or lower extremity edema  Resp: No SOB no cough  GI: No abd pain. No nausea or vomiting. No diarrhea  : No dysuria, hematuria.   MSK: see hpi  Skin: No rash  Neuro: see hpi

## 2022-06-21 NOTE — ED PROVIDER NOTE - OBJECTIVE STATEMENT
65 yo female with pmh depression here with intermittent sharp L. groin pain x approx 1 month. Pt states pain will awake her at night, will at times feel decreased sensation to the L leg. Pt saw pcp who recently performed doppler which was negative for DVT and MRI last week which was sig for DDD. + improvement with advil, denies any hx of trauma, states she feels as if she is limping when she ambulates 2/2 discomfort. Pt denies fevers, chills, abd pain, dysuria, no saddle anesthesia, no urinary incontinence. Pts pcp recommended she see a vascular doctor however she is unable to be seen for another 2 months and feels she cannot wait that long.

## 2022-06-21 NOTE — ED ADULT NURSE NOTE - OBJECTIVE STATEMENT
66 year old female presents to ED via walk in complaining of lower leg pain x1 month. States on May 15 she got COVID and since then she has been having left leg pain starting in left groin radiating down leg. Already received ultrasound to rule out blood clot. No hx of blood clots. Upon assessment A&O x4, ambulatory and well appearing. Rates pain 4/10 at this time, waxes and waning but at its worst 9/10. Did  not take anything for pain today. Bed locked and lowered. Comfort and safety measures maintained.

## 2022-06-21 NOTE — ED ADULT TRIAGE NOTE - CHIEF COMPLAINT QUOTE
covid infection while in [place] 1 month ago, having persistent L leg and groin pain since then  US neg for DVT

## 2022-06-21 NOTE — ED PROVIDER NOTE - NS ED ATTENDING STATEMENT MOD
This was a shared visit with the DAVE. I reviewed and verified the documentation and independently performed the documented:

## 2022-06-21 NOTE — ED PROVIDER NOTE - NSPTACCESSSVCSAPPTDETAILS_ED_ALL_ED_FT
urgent: pt here with L leg pain, likely radicular, pcp recommends vasc surgery c/s, unable to receive appointment until august, also needs spine f/u.

## 2022-06-21 NOTE — ED PROVIDER NOTE - CARE PLAN
At next doctor's appointment request they go through your problem list and update it  Eye surgery Jan 14th   Principal Discharge DX:	Lumbar radicular pain   1

## 2022-06-21 NOTE — ED PROVIDER NOTE - NSFOLLOWUPINSTRUCTIONS_ED_ALL_ED_FT
- stay hydrated.   - take tylenol 975mg and ibuprofen 600mg every 6 hours as needed for pain-take with meals.  -take medrol dose chioma as prescribed, you were given the first dose here (24 mg) so take the second dose tomorrow.   - follow up with your pcp in 1-2 days.  - follow up with the spine clinic, call [phone] to make an appointment  -follow up with vascular surgery, call number above to make an appointment,   - return if symptoms worsen, fever, weakness, numbness/tingling, difficulty ambulating and all other concerns.

## 2022-06-21 NOTE — ED PROVIDER NOTE - ATTENDING APP SHARED VISIT CONTRIBUTION OF CARE
This was a shared visit with DAVE.  I have reviewed and discussed the case with the DAVE and agree with verified documentation unless otherwise documented.  I have independently spoken with and examined the patient and my documentation of history/pe and MDM are above.

## 2022-06-21 NOTE — ED PROVIDER NOTE - NSFOLLOWUPCLINICS_GEN_ALL_ED_FT
[facility]  General Surgery  [address] [place] [address] - [address] [place] [address]  Phone: [phone]  Fax:   Follow Up Time: 1-3 Days

## 2024-07-09 NOTE — ED ADULT NURSE NOTE - INTEGUMENTARY WDL
Color consistent with ethnicity/race, warm, dry intact, resilient. set-up required/verbal cues/1 person assist